# Patient Record
Sex: FEMALE | Race: NATIVE HAWAIIAN OR OTHER PACIFIC ISLANDER
[De-identification: names, ages, dates, MRNs, and addresses within clinical notes are randomized per-mention and may not be internally consistent; named-entity substitution may affect disease eponyms.]

---
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
## 2024-05-31 ENCOUNTER — APPOINTMENT (OUTPATIENT)
Dept: OTOLARYNGOLOGY | Facility: CLINIC | Age: 19
End: 2024-05-31
Payer: COMMERCIAL

## 2024-05-31 VITALS
BODY MASS INDEX: 24.84 KG/M2 | WEIGHT: 135 LBS | HEIGHT: 62 IN | DIASTOLIC BLOOD PRESSURE: 63 MMHG | SYSTOLIC BLOOD PRESSURE: 96 MMHG | HEART RATE: 102 BPM

## 2024-05-31 DIAGNOSIS — J35.1 HYPERTROPHY OF TONSILS: ICD-10-CM

## 2024-05-31 DIAGNOSIS — J03.01 ACUTE RECURRENT STREPTOCOCCAL TONSILLITIS: ICD-10-CM

## 2024-05-31 DIAGNOSIS — R13.12 DYSPHAGIA, OROPHARYNGEAL PHASE: ICD-10-CM

## 2024-05-31 DIAGNOSIS — J35.01 CHRONIC TONSILLITIS: ICD-10-CM

## 2024-05-31 PROCEDURE — 99204 OFFICE O/P NEW MOD 45 MIN: CPT

## 2024-05-31 NOTE — PHYSICAL EXAM
[Hearing Sanders Test (Tuning Fork On Forehead)] : no lateralization of tone [Midline] : trachea located in midline position [Normal] : no rashes [de-identified] : level 2B cervical lymphadenopathy [Hearing Loss Right Only] : normal [Hearing Loss Left Only] : normal [de-identified] :  mildly inflamed turbinates [de-identified] : class 4 tonsils touching the uvula and sticking out into the airway

## 2024-05-31 NOTE — CONSULT LETTER
[Dear  ___] : Dear  [unfilled], [Consult Letter:] : I had the pleasure of evaluating your patient, [unfilled]. [Please see my note below.] : Please see my note below. [Consult Closing:] : Thank you very much for allowing me to participate in the care of this patient.  If you have any questions, please do not hesitate to contact me. [Sincerely,] : Sincerely, [FreeTextEntry3] :  Nilesh Jackson MD FACS

## 2024-05-31 NOTE — ADDENDUM
[FreeTextEntry1] :  Documented by Ming Michelle acting as scribe for Dr. Jackson on 05/31/2024. All Medical record entries made by the Scribe were at my, Dr. Jackson, direction and personally dictated by me on 05/31/2024 . I have reviewed the chart and agree that the record accurately reflects my personal performance of the history, physical exam, assessment and plan. I have also personally directed, reviewed, and agreed with the discharge instructions.

## 2024-05-31 NOTE — HISTORY OF PRESENT ILLNESS
[de-identified] : Patient presents today stating that for a long time whenever she gets sick her tonsils get so swollen that she cannot swallow. She states that she frequently tests positive on cultures for strep. She is unsure if she snores. She denies waking up with a dry mouth in the morning.

## 2024-05-31 NOTE — ASSESSMENT
[FreeTextEntry1] : Reviewed and reconciled medications, allergies, PMHx, PSHx, SocHx, FMHx.  Patient presents today stating that for a long time whenever she gets sick her tonsils get so swollen that she cannot swallow. She states that she frequently tests positive on cultures for strep. She is unsure if she snores. She denies waking up with a dry mouth in the morning.   Physical exam: -ears are clean and clear bilaterally -mildly inflamed turbinates -class 4 tonsils touching the uvula and sticking out into the airway -level 2B cervical lymphadenopathy   Plan: -Discussed tonsillectomy and possible adenoidectomy. Possible complications including but not limited to infection, pain, bleeding, complications from anesthesia, and need for further surgery were discussed. All questions were answered. -FU after surgery

## 2024-07-11 ENCOUNTER — LABORATORY RESULT (OUTPATIENT)
Age: 19
End: 2024-07-11

## 2024-08-01 ENCOUNTER — APPOINTMENT (OUTPATIENT)
Dept: OTOLARYNGOLOGY | Facility: AMBULATORY MEDICAL SERVICES | Age: 19
End: 2024-08-01

## 2024-08-14 ENCOUNTER — APPOINTMENT (OUTPATIENT)
Dept: OTOLARYNGOLOGY | Facility: CLINIC | Age: 19
End: 2024-08-14